# Patient Record
Sex: FEMALE | HISPANIC OR LATINO | URBAN - METROPOLITAN AREA
[De-identification: names, ages, dates, MRNs, and addresses within clinical notes are randomized per-mention and may not be internally consistent; named-entity substitution may affect disease eponyms.]

---

## 2018-07-19 ENCOUNTER — OFFICE VISIT (OUTPATIENT)
Dept: URBAN - METROPOLITAN AREA CLINIC 88 | Facility: CLINIC | Age: 68
End: 2018-07-19
Payer: COMMERCIAL

## 2018-07-19 DIAGNOSIS — E11.9 TYPE 2 DIABETES MELLITUS WITHOUT COMPLICATIONS: Primary | ICD-10-CM

## 2018-07-19 PROCEDURE — 99214 OFFICE O/P EST MOD 30 MIN: CPT | Performed by: OPHTHALMOLOGY

## 2018-07-19 ASSESSMENT — INTRAOCULAR PRESSURE
OD: 15
OS: 14

## 2018-07-19 NOTE — IMPRESSION/PLAN
Impression: Dry eye syndrome of bilateral lacrimal glands: H04.123. Plan: Discussed diagnosis in detail with patient. Explained condition does not have a cure and will need artificial tears for maintenance.

## 2018-07-19 NOTE — IMPRESSION/PLAN
Impression: Type 2 diabetes mellitus without complications: Y78.0. Plan: Diabetes type II: no background retinopathy, no signs of neovascularization noted. Discussed ocular and systemic benefits of blood sugar control.

## 2019-11-21 ENCOUNTER — OFFICE VISIT (OUTPATIENT)
Dept: URBAN - METROPOLITAN AREA CLINIC 88 | Facility: CLINIC | Age: 69
End: 2019-11-21
Payer: COMMERCIAL

## 2019-11-21 PROCEDURE — 99214 OFFICE O/P EST MOD 30 MIN: CPT | Performed by: OPHTHALMOLOGY

## 2019-11-21 ASSESSMENT — KERATOMETRY
OD: 43.88
OS: 43.50

## 2019-11-21 ASSESSMENT — VISUAL ACUITY
OD: 20/20
OS: 20/25

## 2019-11-21 ASSESSMENT — INTRAOCULAR PRESSURE
OD: 15
OS: 16

## 2019-11-21 NOTE — IMPRESSION/PLAN
Impression: Type 2 diabetes mellitus without complications: O95.1. Condition: established, stable. Plan: Diabetes type II: no background retinopathy, no signs of neovascularization noted. Discussed ocular and systemic benefits of blood sugar control.

## 2020-08-31 ENCOUNTER — OFFICE VISIT (OUTPATIENT)
Dept: URBAN - METROPOLITAN AREA CLINIC 88 | Facility: CLINIC | Age: 70
End: 2020-08-31
Payer: COMMERCIAL

## 2020-08-31 DIAGNOSIS — H04.123 DRY EYE SYNDROME OF BILATERAL LACRIMAL GLANDS: Primary | ICD-10-CM

## 2020-08-31 PROCEDURE — 99212 OFFICE O/P EST SF 10 MIN: CPT | Performed by: OPHTHALMOLOGY

## 2020-08-31 ASSESSMENT — INTRAOCULAR PRESSURE
OS: 15
OD: 15

## 2020-08-31 NOTE — IMPRESSION/PLAN
Impression: Dry eye syndrome of bilateral lacrimal glands: H04.123. Plan: Discussed diagnosis in detail with patient. Explained condition does not have a cure and will need artificial tears for maintenance. Ok to use OTC Artificial tears 1 qtt TID OU.

## 2021-06-17 ENCOUNTER — OFFICE VISIT (OUTPATIENT)
Dept: URBAN - METROPOLITAN AREA CLINIC 88 | Facility: CLINIC | Age: 71
End: 2021-06-17
Payer: COMMERCIAL

## 2021-06-17 DIAGNOSIS — H25.13 AGE-RELATED NUCLEAR CATARACT, BILATERAL: ICD-10-CM

## 2021-06-17 PROCEDURE — 99214 OFFICE O/P EST MOD 30 MIN: CPT | Performed by: OPHTHALMOLOGY

## 2021-06-17 ASSESSMENT — VISUAL ACUITY
OD: 20/25
OS: 20/25

## 2021-06-17 ASSESSMENT — INTRAOCULAR PRESSURE
OD: 15
OS: 16

## 2021-06-17 NOTE — IMPRESSION/PLAN
Impression: Type 2 diabetes mellitus without complications: A83.1. Condition: established, stable. Plan: Diabetes type II: no background retinopathy, no signs of neovascularization noted. Discussed ocular and systemic benefits of blood sugar control.

## 2022-06-22 ENCOUNTER — OFFICE VISIT (OUTPATIENT)
Dept: URBAN - METROPOLITAN AREA CLINIC 88 | Facility: CLINIC | Age: 72
End: 2022-06-22
Payer: MEDICARE

## 2022-06-22 DIAGNOSIS — E11.9 TYPE 2 DIABETES MELLITUS WITHOUT COMPLICATIONS: Primary | ICD-10-CM

## 2022-06-22 DIAGNOSIS — H25.13 AGE-RELATED NUCLEAR CATARACT, BILATERAL: ICD-10-CM

## 2022-06-22 DIAGNOSIS — H04.123 DRY EYE SYNDROME OF BILATERAL LACRIMAL GLANDS: ICD-10-CM

## 2022-06-22 PROCEDURE — 99214 OFFICE O/P EST MOD 30 MIN: CPT | Performed by: OPHTHALMOLOGY

## 2022-06-22 ASSESSMENT — KERATOMETRY
OS: 43.50
OD: 43.63

## 2022-06-22 ASSESSMENT — VISUAL ACUITY
OS: 20/25
OD: 20/25

## 2022-06-22 ASSESSMENT — INTRAOCULAR PRESSURE
OD: 14
OS: 14

## 2022-06-22 NOTE — IMPRESSION/PLAN
Impression: Type 2 diabetes mellitus without complications: P67.3. Plan: Reviewed with patient that no retinal vascular changes are occurring from diabetes. Patient to continue care with current medication provider for diabetes management. Importance of retinal exams reviewed. Will continue to observe with dilated examination in 12 months.

## 2023-06-27 ENCOUNTER — OFFICE VISIT (OUTPATIENT)
Dept: URBAN - METROPOLITAN AREA CLINIC 88 | Facility: CLINIC | Age: 73
End: 2023-06-27
Payer: COMMERCIAL

## 2023-06-27 DIAGNOSIS — H25.13 AGE-RELATED NUCLEAR CATARACT, BILATERAL: ICD-10-CM

## 2023-06-27 DIAGNOSIS — E11.9 TYPE 2 DIABETES MELLITUS WITHOUT COMPLICATIONS: Primary | ICD-10-CM

## 2023-06-27 DIAGNOSIS — H04.123 DRY EYE SYNDROME OF BILATERAL LACRIMAL GLANDS: ICD-10-CM

## 2023-06-27 PROCEDURE — 99214 OFFICE O/P EST MOD 30 MIN: CPT | Performed by: OPHTHALMOLOGY

## 2023-06-27 ASSESSMENT — INTRAOCULAR PRESSURE
OS: 16
OD: 15

## 2023-06-27 NOTE — IMPRESSION/PLAN
Impression: Type 2 diabetes mellitus without complications: J55.3. Plan: Reviewed with patient that no retinal vascular changes are occurring from diabetes. Patient to continue care with current medication provider for diabetes management. Importance of retinal exams reviewed. Will continue to observe with dilated examination in 12 months.

## 2023-08-16 NOTE — IMPRESSION/PLAN
English Impression: Age-related nuclear cataract, bilateral: H25.13. Plan: Discussed diagnosis in detail with patient. No treatment is required at this time. The patient will monitor vision changes and contact us with any decrease in vision.

## 2024-06-27 ENCOUNTER — OFFICE VISIT (OUTPATIENT)
Dept: URBAN - METROPOLITAN AREA CLINIC 88 | Facility: CLINIC | Age: 74
End: 2024-06-27
Payer: COMMERCIAL

## 2024-06-27 DIAGNOSIS — H43.393 OTHER VITREOUS OPACITIES, BILATERAL: ICD-10-CM

## 2024-06-27 DIAGNOSIS — E11.9 TYPE 2 DIABETES MELLITUS W/O COMPLICATION: Primary | ICD-10-CM

## 2024-06-27 DIAGNOSIS — H02.839 DERMATOCHALASIS OF UNSPECIFIED EYE, UNSPECIFIED EYELID: ICD-10-CM

## 2024-06-27 DIAGNOSIS — H25.13 AGE-RELATED NUCLEAR CATARACT, BILATERAL: ICD-10-CM

## 2024-06-27 PROCEDURE — 99213 OFFICE O/P EST LOW 20 MIN: CPT | Performed by: OPHTHALMOLOGY

## 2024-06-27 ASSESSMENT — INTRAOCULAR PRESSURE
OS: 12
OD: 13

## 2024-08-12 ENCOUNTER — TECH ONLY (OUTPATIENT)
Dept: URBAN - METROPOLITAN AREA CLINIC 88 | Facility: CLINIC | Age: 74
End: 2024-08-12
Payer: COMMERCIAL

## 2024-08-12 DIAGNOSIS — H02.839 DERMATOCHALASIS OF UNSPECIFIED EYE, UNSPECIFIED EYELID: Primary | ICD-10-CM

## 2025-08-28 ENCOUNTER — OFFICE VISIT (OUTPATIENT)
Dept: URBAN - METROPOLITAN AREA CLINIC 88 | Facility: CLINIC | Age: 75
End: 2025-08-28
Payer: COMMERCIAL

## 2025-08-28 DIAGNOSIS — H04.123 DRY EYE SYNDROME OF BILATERAL LACRIMAL GLANDS: ICD-10-CM

## 2025-08-28 DIAGNOSIS — H25.13 AGE-RELATED NUCLEAR CATARACT, BILATERAL: ICD-10-CM

## 2025-08-28 DIAGNOSIS — E11.9 TYPE 2 DIABETES MELLITUS WITHOUT COMPLICATIONS: Primary | ICD-10-CM

## 2025-08-28 PROCEDURE — 99214 OFFICE O/P EST MOD 30 MIN: CPT | Performed by: OPHTHALMOLOGY

## 2025-08-28 ASSESSMENT — INTRAOCULAR PRESSURE
OS: 14
OD: 14